# Patient Record
Sex: FEMALE | Race: WHITE | Employment: FULL TIME | URBAN - METROPOLITAN AREA
[De-identification: names, ages, dates, MRNs, and addresses within clinical notes are randomized per-mention and may not be internally consistent; named-entity substitution may affect disease eponyms.]

---

## 2019-05-07 ENCOUNTER — OFFICE VISIT (OUTPATIENT)
Dept: FAMILY MEDICINE CLINIC | Age: 29
End: 2019-05-07
Payer: COMMERCIAL

## 2019-05-07 VITALS
DIASTOLIC BLOOD PRESSURE: 88 MMHG | WEIGHT: 200 LBS | BODY MASS INDEX: 31.39 KG/M2 | HEART RATE: 100 BPM | TEMPERATURE: 99.5 F | HEIGHT: 67 IN | RESPIRATION RATE: 18 BRPM | OXYGEN SATURATION: 100 % | SYSTOLIC BLOOD PRESSURE: 132 MMHG

## 2019-05-07 DIAGNOSIS — J01.90 ACUTE BACTERIAL SINUSITIS: Primary | ICD-10-CM

## 2019-05-07 DIAGNOSIS — B96.89 ACUTE BACTERIAL SINUSITIS: Primary | ICD-10-CM

## 2019-05-07 PROCEDURE — 99213 OFFICE O/P EST LOW 20 MIN: CPT | Performed by: PHYSICIAN ASSISTANT

## 2019-05-07 PROCEDURE — 1036F TOBACCO NON-USER: CPT | Performed by: PHYSICIAN ASSISTANT

## 2019-05-07 PROCEDURE — G8427 DOCREV CUR MEDS BY ELIG CLIN: HCPCS | Performed by: PHYSICIAN ASSISTANT

## 2019-05-07 PROCEDURE — G8417 CALC BMI ABV UP PARAM F/U: HCPCS | Performed by: PHYSICIAN ASSISTANT

## 2019-05-07 RX ORDER — PREDNISONE 10 MG/1
10 TABLET ORAL 2 TIMES DAILY
Qty: 10 TABLET | Refills: 0 | Status: SHIPPED | OUTPATIENT
Start: 2019-05-07 | End: 2019-05-12

## 2019-05-07 RX ORDER — AMOXICILLIN 875 MG/1
875 TABLET, COATED ORAL 2 TIMES DAILY
Qty: 20 TABLET | Refills: 0 | Status: SHIPPED | OUTPATIENT
Start: 2019-05-07 | End: 2019-05-17

## 2019-05-07 RX ORDER — FLUTICASONE PROPIONATE 50 MCG
2 SPRAY, SUSPENSION (ML) NASAL DAILY
Qty: 1 BOTTLE | Refills: 0 | Status: SHIPPED | OUTPATIENT
Start: 2019-05-07

## 2019-05-07 RX ORDER — ONDANSETRON 4 MG/1
4 TABLET, FILM COATED ORAL
Qty: 20 TABLET | Refills: 0 | Status: SHIPPED | OUTPATIENT
Start: 2019-05-07 | End: 2019-05-12

## 2019-05-07 RX ORDER — FLUCONAZOLE 150 MG/1
TABLET ORAL
Qty: 1 TABLET | Refills: 0 | Status: SHIPPED | OUTPATIENT
Start: 2019-05-07

## 2019-05-07 NOTE — LETTER
[address]  [place] [address]  Phone: [phone]  Fax: [phone]    Loco Webster        May 7, 2019     Patient: Braxton Gutierrez   YOB: 1990   Date of Visit: 5/7/2019       To Whom it May Concern:    Clinton Joyner was seen in my clinic on 5/7/2019. Please excuse Braxton Gutierrez from work on 05/07/2019 and 05/08/2019. She may return to work on 06/01/2019 her next scheduled day to work. If you have any questions or concerns, please don't hesitate to call.     Sincerely,         Shayna Ferrari PA-C

## 2019-05-07 NOTE — PATIENT INSTRUCTIONS
Patient Education        Saline Nasal Washes: Care Instructions  Your Care Instructions  Saline nasal washes help keep the nasal passages open by washing out thick or dried mucus. This simple remedy can help relieve symptoms of allergies, sinusitis, and colds. It also can make the nose feel more comfortable by keeping the mucous membranes moist. You may notice a little burning sensation in your nose the first few times you use the solution, but this usually gets better in a few days. Follow-up care is a key part of your treatment and safety. Be sure to make and go to all appointments, and call your doctor if you are having problems. It's also a good idea to know your test results and keep a list of the medicines you take. How can you care for yourself at home? · You can buy premixed saline solution in a squeeze bottle or other sinus rinse products at a drugstore. Read and follow the instructions on the label. · You also can make your own saline solution by adding 1 teaspoon of salt and 1 teaspoon of baking soda to 2 cups of distilled water. · If you use a homemade solution, pour a small amount into a clean bowl. Using a rubber bulb syringe, squeeze the syringe and place the tip in the salt water. Pull a small amount of the salt water into the syringe by relaxing your hand. · Sit down with your head tilted slightly back. Do not lie down. Put the tip of the bulb syringe or the squeeze bottle a little way into one of your nostrils. Gently drip or squirt a few drops into the nostril. Repeat with the other nostril. Some sneezing and gagging are normal at first.  · Gently blow your nose. · Wipe the syringe or bottle tip clean after each use. · Repeat this 2 or 3 times a day. · Use nasal washes gently if you have nosebleeds often. When should you call for help?   Watch closely for changes in your health, and be sure to contact your doctor if:    · You often get nosebleeds.     · You have problems doing the nasal washes. Where can you learn more? Go to https://chpepiceweb.G5. org and sign in to your H[employer]ern[employer] account. Enter [phone] in the [facility] box to learn more about \"Saline Nasal Washes: Care Instructions. \"     If you do not have an account, please click on the \"Sign Up Now\" link. Current as of: March 27, 2018  Content Version: 11.9  © [id number] [employer]. Care instructions adapted under license by [facility] ([facility]). If you have questions about a medical condition or this instruction, always ask your healthcare professional. [address] any warranty or liability for your use of this information. Patient Education        Sinusitis: Care Instructions  Your Care Instructions    Sinusitis is an infection of the lining of the sinus cavities in your head. Sinusitis often follows a cold. It causes pain and pressure in your head and face. In most cases, sinusitis gets better on its own in 1 to 2 weeks. But some mild symptoms may last for several weeks. Sometimes antibiotics are needed. Follow-up care is a key part of your treatment and safety. Be sure to make and go to all appointments, and call your doctor if you are having problems. It's also a good idea to know your test results and keep a list of the medicines you take. How can you care for yourself at home? · Take an over-the-counter pain medicine, such as acetaminophen (Tylenol), ibuprofen (Advil, Motrin), or naproxen (Aleve). Read and follow all instructions on the label. · If the doctor prescribed antibiotics, take them as directed. Do not stop taking them just because you feel better. You need to take the full course of antibiotics. · Be careful when taking over-the-counter cold or flu medicines and Tylenol at the same time. Many of these medicines have acetaminophen, which is Tylenol. Read the labels to make sure that you are not taking more than the recommended dose.  Too much acetaminophen (Tylenol) can be harmful. · Breathe warm, moist air from a steamy shower, a hot bath, or a sink filled with hot water. Avoid cold, dry air. Using a humidifier in your home may help. Follow the directions for cleaning the machine. · Use saline (saltwater) nasal washes to help keep your nasal passages open and wash out mucus and bacteria. You can buy saline nose drops at a grocery store or drugstore. Or you can make your own at home by adding 1 teaspoon of salt and 1 teaspoon of baking soda to 2 cups of distilled water. If you make your own, fill a bulb syringe with the solution, insert the tip into your nostril, and squeeze gently. Mrayanne Sherry your nose. · Put a hot, wet towel or a warm gel pack on your face 3 or 4 times a day for 5 to 10 minutes each time. · Try a decongestant nasal spray like oxymetazoline (Afrin). Do not use it for more than 3 days in a row. Using it for more than 3 days can make your congestion worse. When should you call for help? Call your doctor now or seek immediate medical care if:    · You have new or worse swelling or redness in your face or around your eyes.     · You have a new or higher fever.    Watch closely for changes in your health, and be sure to contact your doctor if:    · You have new or worse facial pain.     · The mucus from your nose becomes thicker (like pus) or has new blood in it.     · You are not getting better as expected. Where can you learn more? Go to https://SymvatopeMessageGears.Night Up. org and sign in to your K[employer] account. Enter X052 in the [facility] box to learn more about \"Sinusitis: Care Instructions. \"     If you do not have an account, please click on the \"Sign Up Now\" link. Current as of: March 27, 2018  Content Version: 11.9  © 9774-4787 [employer]. Care instructions adapted under license by [facility] ([facility]).  If you have questions about a medical condition or this instruction, always ask your healthcare professional. [employer] disclaims any warranty or liability for your use of this information.

## 2019-05-07 NOTE — PROGRESS NOTES
[address]  [place] [address]  Phone: [phone]  Fax: [phone]       [facility]    Pt Name: Malika Grigsby  MRN: [id number]  [place] 1990  Date of evaluation: 5/7/2019  Provider: Wilver Arias PA-C     [facility]       Chief Complaint   Patient presents with    Sinusitis     symptoms started yesterday      Pharyngitis    Headache           HISTORY OF PRESENT ILLNESS  (Location/Symptom, Timing/Onset, Context/Setting, Quality, Duration, Modifying Factors, Severity.)   Malika Grigsby is a 26 y.o. White [1] female who presents to the office for evaluation of      Sinusitis   This is a new problem. The current episode started yesterday. Associated symptoms include congestion, coughing, headaches, sinus pressure, a sore throat and swollen glands. Pertinent negatives include no chills, diaphoresis, ear pain, hoarse voice, neck pain, shortness of breath or sneezing. Past treatments include nothing. Nursing Notes were reviewed. REVIEW OF SYSTEMS    (2-9 systems for level 4, 10 or more for level 5)     Review of Systems   Constitutional: Positive for fatigue. Negative for chills, diaphoresis and fever. HENT: Positive for congestion, postnasal drip, sinus pressure, sinus pain and sore throat. Negative for ear discharge, ear pain, hoarse voice, rhinorrhea and sneezing. Eyes: Negative. Respiratory: Positive for cough. Negative for chest tightness, shortness of breath and wheezing. Cardiovascular: Negative. Gastrointestinal: Positive for nausea. Genitourinary: Negative. Musculoskeletal: Negative for neck pain. Neurological: Positive for headaches. Except as noted above the remainder of the review of systems was reviewed andnegative. PAST MEDICAL HISTORY   History reviewed.     Past Medical History:   Diagnosis Date    Depression     on Zoloft    Gestational hypertension     history, G2  Hypoglycemia 10/2014         SURGICAL HISTORY     History reviewed. Past Surgical History:   Procedure Laterality Date     SECTION      LTCS    CHOLECYSTECTOMY      EYE SURGERY      cyst from right eye    LAPAROSCOPY      cholecystectomy    TUBAL LIGATION           CURRENT MEDICATIONS       Current Outpatient Medications   Medication Sig Dispense Refill    amoxicillin (AMOXIL) 875 MG tablet Take 1 tablet by mouth 2 times daily for 10 days 20 tablet 0    predniSONE (DELTASONE) 10 MG tablet Take 1 tablet by mouth 2 times daily for 5 days 10 tablet 0    fluticasone (FLONASE) 50 MCG/ACT nasal spray 2 sprays by Nasal route daily 1 Bottle 0    fluconazole (DIFLUCAN) 150 MG tablet Take 1 tablet for 2 days then 1 tablet in 72 hours if symptoms persist. 1 tablet 0    ondansetron (ZOFRAN) 4 MG tablet Take 1 tablet by mouth every 6-8 hours as needed for Nausea or Vomiting 20 tablet 0    ibuprofen (ADVIL;MOTRIN) 800 MG tablet Take 1 tablet by mouth every 8 hours as needed for Pain or Fever 15 tablet 0     No current facility-administered medications for this visit. ALLERGIES     Sulfa antibiotics    FAMILY HISTORY           Problem Relation Age of Onset    Diabetes Maternal Grandmother     Hypertension Maternal Grandmother     Cancer Brother         lymphoma    Heart Surgery Paternal Grandfather     Heart Attack Paternal Grandfather      Family Status   Relation Name Status    MGM  (Not Specified)    Brother  (Not Specified)    PGF  (Not Specified)          SOCIAL HISTORY      reports that she has never smoked. She has never used smokeless tobacco. She reports that she drinks alcohol. She reports that she does not use drugs.       PHYSICAL EXAM    (up to 7 for level 4, 8 or more for level 5)     Vitals:    19 1213   BP: 132/88   Site: Left Upper Arm   Position: Sitting   Cuff Size: Medium Adult   Pulse: 100   Resp: 18   Temp: 99.5 °F (37.5 °C)   TempSrc: Tympanic   SpO2: 100% Weight: 200 lb (90.7 kg)   Height: 5' 7\" (1.702 m)         Physical Exam   Constitutional: She is oriented to person, place, and time. She appears well-developed and well-nourished. No distress. HENT:   Head: Normocephalic and atraumatic. Right Ear: External ear normal.   Left Ear: External ear normal.   Eyes: Pupils are equal, round, and reactive to light. Conjunctivae and EOM are normal. Right eye exhibits no discharge. Left eye exhibits no discharge. No scleral icterus. Neck: Normal range of motion. Neck supple. Cardiovascular: Normal rate, regular rhythm and normal heart sounds. Pulmonary/Chest: Effort normal and breath sounds normal.   Abdominal: Soft. Bowel sounds are normal.   Neurological: She is alert and oriented to person, place, and time. Skin: Skin is warm and dry. She is not diaphoretic. Nursing note and vitals reviewed. DIFFERENTIAL DIAGNOSIS:       Nina Huang reviewed the disposition diagnosis with the patient and or their family/guardian. I have answered their questions and given discharge instructions. They voiced understanding of these instructions and did not have anyfurther questions or complaints. PROCEDURES:  No orders of the defined types were placed in this encounter. No results found for this visit on 19. FINALIMPRESSION      1. Acute bacterial sinusitis            PLAN     Return if symptoms worsen or fail to improve.         DISCHARGEMEDICATIONS:  Orders Placed This Encounter   Medications    amoxicillin (AMOXIL) 875 MG tablet     Sig: Take 1 tablet by mouth 2 times daily for 10 days     Dispense:  20 tablet     Refill:  0    predniSONE (DELTASONE) 10 MG tablet     Sig: Take 1 tablet by mouth 2 times daily for 5 days     Dispense:  10 tablet     Refill:  0    fluticasone (FLONASE) 50 MCG/ACT nasal spray     Si sprays by Nasal route daily     Dispense:  1 Bottle     Refill:  0    fluconazole (DIFLUCAN) 150 MG tablet     Sig: Take 1 tablet for 2 days then 1 tablet in 72 hours if symptoms persist.     Dispense:  1 tablet     Refill:  0    ondansetron (ZOFRAN) 4 MG tablet     Sig: Take 1 tablet by mouth every 6-8 hours as needed for Nausea or Vomiting     Dispense:  20 tablet     Refill:  0         Plan:  Based on the duration and severity of the symptoms-- I will treat this as bacterial at this time. Patient instructed to complete antibiotic prescription fully. May use Motrin/Tylenol for fever/pain. Saline washes, salt water gargles and over the counter preparations if desired. Patient agreeable to treatment plan. Educational materials provided on AVS.  Follow up if symptoms do not improve/worsen. Patient instructed to return to the office if symptoms worsen, return, or have any other concerns. Patient understands and is agreeable.          Kadie Bowman PA-C 5/9/2019 7:00 PM

## 2019-05-09 ASSESSMENT — ENCOUNTER SYMPTOMS
NAUSEA: 1
HOARSE VOICE: 0
SINUS PRESSURE: 1
SORE THROAT: 1
CHEST TIGHTNESS: 0
EYES NEGATIVE: 1
SHORTNESS OF BREATH: 0
SINUS PAIN: 1
RHINORRHEA: 0
SWOLLEN GLANDS: 1
COUGH: 1
WHEEZING: 0